# Patient Record
Sex: MALE | Race: BLACK OR AFRICAN AMERICAN
[De-identification: names, ages, dates, MRNs, and addresses within clinical notes are randomized per-mention and may not be internally consistent; named-entity substitution may affect disease eponyms.]

---

## 2017-10-17 ENCOUNTER — HOSPITAL ENCOUNTER (EMERGENCY)
Dept: HOSPITAL 17 - NEPK | Age: 27
Discharge: HOME | End: 2017-10-17
Payer: SELF-PAY

## 2017-10-17 VITALS
OXYGEN SATURATION: 100 % | DIASTOLIC BLOOD PRESSURE: 83 MMHG | HEART RATE: 71 BPM | TEMPERATURE: 98.3 F | RESPIRATION RATE: 18 BRPM | SYSTOLIC BLOOD PRESSURE: 145 MMHG

## 2017-10-17 VITALS — WEIGHT: 286.6 LBS | HEIGHT: 76 IN | BODY MASS INDEX: 34.9 KG/M2

## 2017-10-17 DIAGNOSIS — J01.00: Primary | ICD-10-CM

## 2017-10-17 DIAGNOSIS — F17.200: ICD-10-CM

## 2017-10-17 PROCEDURE — 99283 EMERGENCY DEPT VISIT LOW MDM: CPT

## 2017-10-17 NOTE — PD
HPI


Chief Complaint:  Cold / Flu Symptoms


Time Seen by Provider:  15:09


Travel History


International Travel<30 days:  No


Contact w/Intl Traveler<30days:  No


Traveled to known affect area:  No





History of Present Illness


HPI


27-year-old male presents to the emergency department for evaluation of sinus 

pressure, frontal headache, nasal congestion, and a cough persisting over the 

last week and a half.  Patient states he tried over-the-counter medications and 

things seemed to improve mildly but then got significantly worse.  He states 

that there is a significant pain along his frontal sinuses and a pressure in 

his eyes.  It is constant, sometimes throbbing, radiates to his ears 

occasionally.  Denies any trauma.  Has had no fever or chills.  Denies chest 

pain or shortness of breath.  Has no other symptoms to report.





PFSH


Past Medical History


ADHD:  Yes


Asthma:  Yes


Autoimmune Disease:  No


Anxiety:  No


Depression:  No


Cardiovascular Problems:  No


Gastrointestinal Disorders:  Yes (HAD VOMITING AND DIARRHEA A WEEK AGO)


Genitourinary:  No


Musculoskeletal:  No


Neurologic:  No


Psychiatric:  No


Respiratory:  Yes (BRONCHITIS)





Past Surgical History


Other Surgery:  Yes





Social History


Alcohol Use:  No


Tobacco Use:  Yes (PK DAY)


Substance Use:  Yes (MARIJUANA DAILY)





Allergies-Medications


(Allergen,Severity, Reaction):  


Coded Allergies:  


     No Known Allergies (Verified , 10/17/17)


Reported Meds & Prescriptions





Reported Meds & Active Scripts


Active


Nasonex Nasal Spray (Mometasone Furoate) 50 Mcg/Act Naspr 2 [place] EACH NARE 

DAILY


Augmentin (Amoxicillin-Clavulanate) 875-125 Mg Tab 1 Tab PO BID 10 Days


Tylenol #3 (Acetaminophen/Codeine Phosphate) 300 Mg/30 Mg Tab 1-2 Tab PO Q6HPRN


Reported


Proventil Hfa (Albuterol Sulfate) 6.7 Gm Aero 2 Puff INH Q4HPRN








Review of Systems


Except as stated in HPI:  all other systems reviewed are Neg





Physical Exam


Narrative


GENERAL: Well-nourished, well-developed patient ambulatory no acute distress.


SKIN: Focused skin assessment warm/dry.


HEAD: Normocephalic.  Neck and tenderness to palpation of maxillary sinuses, 

worse on the left than the right.


EYES: No scleral icterus. No injection or drainage.  EOMI.  PERRLA


ENT: Mucosa pink and moist. No erythema or exudates. No uvular edema.  

Postnasal drip No uvular, palatal, or tonsillar deviation. Airway patent. Nasal 

turbinates appear inflamed without nasal blood, purulent drainage or septal 

hematoma.


NECK: Supple, trachea midline. No JVD or lymphadenopathy.


CARDIOVASCULAR: Regular rate and rhythm without murmurs, gallops, or rubs. 


RESPIRATORY: Breath sounds equal bilaterally. No accessory muscle use.


GASTROINTESTINAL: Abdomen soft, non-tender, nondistended. 


MUSCULOSKELETAL: No cyanosis, or edema. 


BACK: Nontender without obvious deformity. No CVA tenderness.





Data


Data


Last Documented VS





Vital Signs








  Date Time  Temp Pulse Resp B/P (MAP) Pulse Ox O2 Delivery O2 Flow Rate FiO2


 


10/17/17 15:37        


 


10/17/17 14:49 98.3 71 18  100 Room Air  








Orders





 Orders


Ed Discharge Order (10/17/17 15:15)








[facility]


Medical Decision Making


Medical Screen Exam Complete:  Yes


Emergency Medical Condition:  Yes


Medical Record Reviewed:  Yes


Differential Diagnosis


Sinusitis chronic versus recurrent versus acute versus URI versus common cold 

versus allergies


Narrative Course


27 year male presents to the emergency department for evaluation.  Physical 

exam and history are consistent with a sinus infection.  Patient will be 

treated for this as he has tried over-the-counter remedies with minimal 

improvement followed by acute worsening of his symptoms.  He is encouraged to 

follow-up with primary care provider.  He agrees to return immediately with 

acute worsening of symptoms.





Diagnosis





 Primary Impression:  


 Sinusitis, acute


 Qualified Codes:  J01.00 - Acute maxillary sinusitis, unspecified


Referrals:  


Primary Care Physician


Patient Instructions:  General Instructions, Sinusitis (GEN)


Departure Forms:  Tests/Procedures, Work Release   Enter return to work date:  

Oct 19, 2017





***Additional Instructions:  


Humidified air may help to alleviate symptoms


Normal saline nasal spray may also help to alleviate symptoms


Follow-up with a primary care provider


Return immediately with any acute worsening of symptoms


***Med/Other Pt SpecificInfo:  Prescription(s) given


Scripts


Mometasone Nasal Spray (Nasonex Nasal Spray) 50 Mcg/Act Naspr


2 SPRAY EACH NARE DAILY for Allergy Management, #1 BOTTLE 0 Refills


   Prov: Kaleigh Espinoza         10/17/17 


Amoxicillin-Clavulanate (Augmentin) 875-125 Mg Tab


1 TAB PO BID for Infection for 10 Days, #20 TAB 0 Refills


   Prov: Kaleigh Espinoza         10/17/17


Disposition:  01 DISCHARGE HOME


Condition:  Stable











Kaleigh Espinoza Oct 17, 2017 15:15

## 2018-01-10 ENCOUNTER — HOSPITAL ENCOUNTER (EMERGENCY)
Dept: HOSPITAL 17 - NEPD | Age: 28
LOS: 1 days | Discharge: HOME | End: 2018-01-11
Payer: SELF-PAY

## 2018-01-10 VITALS
OXYGEN SATURATION: 95 % | TEMPERATURE: 98.5 F | DIASTOLIC BLOOD PRESSURE: 80 MMHG | SYSTOLIC BLOOD PRESSURE: 143 MMHG | HEART RATE: 95 BPM | RESPIRATION RATE: 16 BRPM

## 2018-01-10 VITALS — HEIGHT: 76 IN | BODY MASS INDEX: 34.9 KG/M2 | WEIGHT: 286.6 LBS

## 2018-01-10 DIAGNOSIS — M79.671: Primary | ICD-10-CM

## 2018-01-10 DIAGNOSIS — F17.210: ICD-10-CM

## 2018-01-10 DIAGNOSIS — G89.29: ICD-10-CM

## 2018-01-10 DIAGNOSIS — F12.90: ICD-10-CM

## 2018-01-10 PROCEDURE — 99284 EMERGENCY DEPT VISIT MOD MDM: CPT
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
## 2018-03-14 ENCOUNTER — HOSPITAL ENCOUNTER (EMERGENCY)
Dept: HOSPITAL 17 - NEPD | Age: 28
Discharge: HOME | End: 2018-03-14
Payer: COMMERCIAL

## 2018-03-14 VITALS
DIASTOLIC BLOOD PRESSURE: 77 MMHG | OXYGEN SATURATION: 96 % | TEMPERATURE: 98.2 F | RESPIRATION RATE: 16 BRPM | HEART RATE: 74 BPM | SYSTOLIC BLOOD PRESSURE: 142 MMHG

## 2018-03-14 VITALS — HEIGHT: 76 IN | BODY MASS INDEX: 32.22 KG/M2 | WEIGHT: 264.55 LBS

## 2018-03-14 DIAGNOSIS — Z86.59: ICD-10-CM

## 2018-03-14 DIAGNOSIS — T31.0: ICD-10-CM

## 2018-03-14 DIAGNOSIS — T23.152A: Primary | ICD-10-CM

## 2018-03-14 DIAGNOSIS — Z23: ICD-10-CM

## 2018-03-14 DIAGNOSIS — T23.222A: ICD-10-CM

## 2018-03-14 DIAGNOSIS — Z72.0: ICD-10-CM
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
## 2018-03-14 DIAGNOSIS — Z87.09: ICD-10-CM
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
## 2018-03-14 PROCEDURE — 16020 DRESS/DEBRID P-THICK BURN S: CPT

## 2018-03-14 PROCEDURE — 90714 TD VACC NO PRESV 7 YRS+ IM: CPT

## 2018-03-14 PROCEDURE — 90471 IMMUNIZATION ADMIN: CPT
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
